# Patient Record
Sex: FEMALE | Race: WHITE | NOT HISPANIC OR LATINO | URBAN - METROPOLITAN AREA
[De-identification: names, ages, dates, MRNs, and addresses within clinical notes are randomized per-mention and may not be internally consistent; named-entity substitution may affect disease eponyms.]

---

## 2018-12-26 ENCOUNTER — IMPORTED ENCOUNTER (OUTPATIENT)
Dept: URBAN - METROPOLITAN AREA CLINIC 50 | Facility: CLINIC | Age: 83
End: 2018-12-26

## 2020-01-20 ENCOUNTER — IMPORTED ENCOUNTER (OUTPATIENT)
Dept: URBAN - METROPOLITAN AREA CLINIC 50 | Facility: CLINIC | Age: 85
End: 2020-01-20

## 2021-01-26 ENCOUNTER — IMPORTED ENCOUNTER (OUTPATIENT)
Dept: URBAN - METROPOLITAN AREA CLINIC 50 | Facility: CLINIC | Age: 86
End: 2021-01-26

## 2021-04-17 ASSESSMENT — VISUAL ACUITY
OS_BAT: 20/100
OD_BAT: 20/70
OD_BAT: 20/200
OS_CC: 20/40
OS_BAT: 20/50
OS_OTHER: 20/50.
OS_OTHER: 20/40. 20/60.
OS_OTHER: 20/100.
OS_CC: 20/30
OD_CC: 20/30
OD_OTHER: 20/70.
OD_CC: J1+
OD_CC: J1
OD_OTHER: 20/200.
OD_OTHER: 20/40. 20/70.
OS_CC: J1+
OD_BAT: 20/40
OS_CC: J1
OD_CC: 20/40
OD_CC: 20/30
OS_CC: 20/30
OS_BAT: 20/40

## 2021-04-17 ASSESSMENT — TONOMETRY
OS_IOP_MMHG: 17
OD_IOP_MMHG: 14
OS_IOP_MMHG: 14
OD_IOP_MMHG: 13
OD_IOP_MMHG: 15
OS_IOP_MMHG: 14

## 2021-08-11 ENCOUNTER — PROBLEM (OUTPATIENT)
Dept: URBAN - METROPOLITAN AREA CLINIC 49 | Facility: CLINIC | Age: 86
End: 2021-08-11

## 2021-08-11 DIAGNOSIS — S00.10XA: ICD-10-CM

## 2021-08-11 PROCEDURE — 92012 INTRM OPH EXAM EST PATIENT: CPT

## 2021-08-11 RX ORDER — PREDNISONE 20MG 20 MG/1
1 TABLET ORAL ONCE A DAY
Start: 2021-08-11 | End: 2021-08-16

## 2021-08-11 ASSESSMENT — TONOMETRY
OD_IOP_MMHG: 15
OS_IOP_MMHG: 14

## 2021-08-11 ASSESSMENT — VISUAL ACUITY
OS_CC: 20/40
OD_CC: 20/40

## 2021-08-11 NOTE — PATIENT DISCUSSION
discussed with patient that it is possibly from the hygienist.  recommend patient start prednisone 20 mg once a day for 5 days.
86

## 2022-01-25 ENCOUNTER — COMPREHENSIVE EXAM (OUTPATIENT)
Dept: URBAN - METROPOLITAN AREA CLINIC 49 | Facility: CLINIC | Age: 87
End: 2022-01-25

## 2022-01-25 DIAGNOSIS — H35.3121: ICD-10-CM

## 2022-01-25 DIAGNOSIS — Z01.01: ICD-10-CM

## 2022-01-25 DIAGNOSIS — H25.813: ICD-10-CM

## 2022-01-25 DIAGNOSIS — H52.4: ICD-10-CM

## 2022-01-25 DIAGNOSIS — S00.10XA: ICD-10-CM

## 2022-01-25 DIAGNOSIS — H43.23: ICD-10-CM

## 2022-01-25 PROCEDURE — 92014 COMPRE OPH EXAM EST PT 1/>: CPT

## 2022-01-25 PROCEDURE — 92134 CPTRZ OPH DX IMG PST SGM RTA: CPT

## 2022-01-25 PROCEDURE — 92015 DETERMINE REFRACTIVE STATE: CPT

## 2022-01-25 ASSESSMENT — VISUAL ACUITY
OD_CC: J1+
OU_CC: 20/30
OD_GLARE: 20/50
OS_CC: J1
OU_CC: J1+
OS_CC: 20/30-2
OD_CC: 20/30
OS_GLARE: 20/40

## 2022-01-25 ASSESSMENT — TONOMETRY
OD_IOP_MMHG: 16
OS_IOP_MMHG: 16

## 2022-01-25 NOTE — PATIENT DISCUSSION
discussed with patient that it is possibly from the hygienist.  recommend patient start prednisone 20 mg once a day for 5 days.

## 2022-05-06 ENCOUNTER — DIAGNOSTICS ONLY (OUTPATIENT)
Dept: URBAN - METROPOLITAN AREA CLINIC 52 | Facility: CLINIC | Age: 87
End: 2022-05-06

## 2022-05-06 DIAGNOSIS — H25.813: ICD-10-CM

## 2022-05-06 PROCEDURE — 92083 EXTENDED VISUAL FIELD XM: CPT

## 2022-05-10 ENCOUNTER — DIAGNOSTICS ONLY (OUTPATIENT)
Dept: URBAN - METROPOLITAN AREA CLINIC 48 | Facility: CLINIC | Age: 87
End: 2022-05-10

## 2022-05-10 DIAGNOSIS — H52.4: ICD-10-CM

## 2022-05-10 PROCEDURE — 92083 EXTENDED VISUAL FIELD XM: CPT

## 2022-05-10 NOTE — PATIENT DISCUSSION
Monocular Johnny completed due to requirement for state of [place]. DMV completed today.

## 2023-01-31 ENCOUNTER — COMPREHENSIVE EXAM (OUTPATIENT)
Dept: URBAN - METROPOLITAN AREA CLINIC 48 | Facility: LOCATION | Age: 88
End: 2023-01-31

## 2023-01-31 DIAGNOSIS — H43.23: ICD-10-CM

## 2023-01-31 DIAGNOSIS — H25.813: ICD-10-CM

## 2023-01-31 DIAGNOSIS — Z01.01: ICD-10-CM

## 2023-01-31 DIAGNOSIS — H52.4: ICD-10-CM

## 2023-01-31 DIAGNOSIS — H35.3121: ICD-10-CM

## 2023-01-31 PROCEDURE — 92015 DETERMINE REFRACTIVE STATE: CPT

## 2023-01-31 PROCEDURE — 92014 COMPRE OPH EXAM EST PT 1/>: CPT

## 2023-01-31 PROCEDURE — 92134 CPTRZ OPH DX IMG PST SGM RTA: CPT

## 2023-01-31 ASSESSMENT — TONOMETRY
OD_IOP_MMHG: 18
OS_IOP_MMHG: 19

## 2023-01-31 ASSESSMENT — VISUAL ACUITY
OD_GLARE: 20/70+2
OS_GLARE: 20/150
OD_CC: 20/40
OS_GLARE: 20/70
OU_CC: J1+@15
OD_GLARE: 20/40-2
OS_CC: 20/50-1

## 2023-01-31 ASSESSMENT — KERATOMETRY
OS_K2POWER_DIOPTERS: 47.25
OD_AXISANGLE_DEGREES: 70
OS_AXISANGLE2_DEGREES: 65
OS_AXISANGLE_DEGREES: 155
OD_K2POWER_DIOPTERS: 47.75
OD_AXISANGLE2_DEGREES: 160
OD_K1POWER_DIOPTERS: 46.25
OS_K1POWER_DIOPTERS: 46.00

## 2023-10-12 NOTE — PATIENT DISCUSSION
"""Informed patient that their cataract is visually significant and meets the criteria for cataract "" Crescentic Advancement Flap Text: The defect edges were debeveled with a #15 scalpel blade.  Given the location of the defect and the proximity to free margins a crescentic advancement flap was deemed most appropriate.  Using a sterile surgical marker, the appropriate advancement flap was drawn incorporating the defect and placing the expected incisions within the relaxed skin tension lines where possible.    The area thus outlined was incised deep to adipose tissue with a #15 scalpel blade.  The skin margins were undermined to an appropriate distance in all directions utilizing iris scissors.

## 2024-02-06 ENCOUNTER — COMPREHENSIVE EXAM (OUTPATIENT)
Dept: URBAN - METROPOLITAN AREA CLINIC 48 | Facility: LOCATION | Age: 89
End: 2024-02-06

## 2024-02-06 DIAGNOSIS — H52.4: ICD-10-CM

## 2024-02-06 DIAGNOSIS — H25.813: ICD-10-CM

## 2024-02-06 DIAGNOSIS — H35.3121: ICD-10-CM

## 2024-02-06 DIAGNOSIS — H43.23: ICD-10-CM

## 2024-02-06 DIAGNOSIS — Z01.01: ICD-10-CM

## 2024-02-06 PROCEDURE — 92015 DETERMINE REFRACTIVE STATE: CPT

## 2024-02-06 PROCEDURE — 92014 COMPRE OPH EXAM EST PT 1/>: CPT

## 2024-02-06 ASSESSMENT — VISUAL ACUITY
OS_CC: 20/50+2
OS_GLARE: 20/70-1
OD_GLARE: 20/50
OD_GLARE: 20/50+2
OS_GLARE: 20/60-1
OD_CC: 20/50+2
OU_SC: 20/50+2
OD_SC: 20/50+2
OU_CC: J1@14"
OS_SC: 20/200
OU_CC: 20/50+2

## 2024-02-06 ASSESSMENT — KERATOMETRY
OD_AXISANGLE_DEGREES: 45
OS_AXISANGLE2_DEGREES: 60
OD_K2POWER_DIOPTERS: 48.00
OS_K2POWER_DIOPTERS: 48.75
OS_AXISANGLE_DEGREES: 150
OD_AXISANGLE2_DEGREES: 135
OS_K1POWER_DIOPTERS: 46.75
OD_K1POWER_DIOPTERS: 46.50

## 2024-02-06 ASSESSMENT — TONOMETRY
OS_IOP_MMHG: 18
OD_IOP_MMHG: 20

## 2024-10-26 ENCOUNTER — APPOINTMENT (RX ONLY)
Dept: URBAN - METROPOLITAN AREA CLINIC 56 | Facility: CLINIC | Age: 89
Setting detail: DERMATOLOGY
End: 2024-10-26

## 2024-10-26 DIAGNOSIS — D69.2 OTHER NONTHROMBOCYTOPENIC PURPURA: ICD-10-CM

## 2024-10-26 DIAGNOSIS — D485 NEOPLASM OF UNCERTAIN BEHAVIOR OF SKIN: ICD-10-CM

## 2024-10-26 PROBLEM — D48.5 NEOPLASM OF UNCERTAIN BEHAVIOR OF SKIN: Status: ACTIVE | Noted: 2024-10-26

## 2024-10-26 PROCEDURE — ? BIOPSY BY PUNCH METHOD

## 2024-10-26 PROCEDURE — 11103 TANGNTL BX SKIN EA SEP/ADDL: CPT

## 2024-10-26 PROCEDURE — ? COUNSELING

## 2024-10-26 PROCEDURE — ? BIOPSY BY SHAVE METHOD

## 2024-10-26 PROCEDURE — 99202 OFFICE O/P NEW SF 15 MIN: CPT | Mod: 25

## 2024-10-26 PROCEDURE — 11104 PUNCH BX SKIN SINGLE LESION: CPT

## 2024-10-26 ASSESSMENT — LOCATION ZONE DERM: LOCATION ZONE: ARM

## 2024-10-26 ASSESSMENT — LOCATION DETAILED DESCRIPTION DERM: LOCATION DETAILED: LEFT PROXIMAL DORSAL FOREARM

## 2024-10-26 ASSESSMENT — LOCATION SIMPLE DESCRIPTION DERM: LOCATION SIMPLE: LEFT FOREARM

## 2024-10-26 NOTE — PROCEDURE: BIOPSY BY PUNCH METHOD
Detail Level: Detailed
Was A Bandage Applied: Yes
Punch Size In Mm: 5
Size Of Lesion In Cm (Optional): 0
Depth Of Punch Biopsy: dermis
Biopsy Type: H and E
Anesthesia Type: 1% lidocaine with epinephrine
Anesthesia Volume In Cc: 1
Hemostasis: None
Epidermal Sutures: 4-0 Monocryl
Wound Care: Petrolatum
Dressing: Band-Aid
Patient Will Remove Sutures At Home?: No
Lab: 6
Lab Facility: [id number]
Consent was obtained and risks were reviewed including but not limited to scarring, infection, bleeding, scabbing, incomplete removal, nerve damage and allergy to anesthesia.
Post-Care Instructions: I reviewed with the patient in detail post-care instructions. Patient is to keep the biopsy site dry overnight, and then apply Vaseline twice daily until healed.
Home Suture Removal Text: Patient was provided a home suture removal kit and will remove their sutures at home.  If they have any questions or difficulties they will call the office.
Notification Instructions: Patient will be notified of biopsy results. However, patient instructed to call the office if not contacted within 2 weeks.
Billing Type: Third-Party Bill
Information: Selecting Yes will display possible errors in your note based on the variables you have selected. This validation is only offered as a suggestion for you. PLEASE NOTE THAT THE VALIDATION TEXT WILL BE REMOVED WHEN YOU FINALIZE YOUR NOTE. IF YOU WANT TO FAX A PRELIMINARY NOTE YOU WILL NEED TO TOGGLE THIS TO 'NO' IF YOU DO NOT WANT IT IN YOUR FAXED NOTE.

## 2024-11-20 ENCOUNTER — APPOINTMENT (RX ONLY)
Dept: URBAN - METROPOLITAN AREA CLINIC 56 | Facility: CLINIC | Age: 89
Setting detail: DERMATOLOGY
End: 2024-11-20

## 2024-11-20 PROBLEM — D04.62 CARCINOMA IN SITU OF SKIN OF LEFT UPPER LIMB, INCLUDING SHOULDER: Status: ACTIVE | Noted: 2024-11-20

## 2024-11-20 PROCEDURE — ? CRYOSURGICAL DESTRUCTION

## 2024-11-20 PROCEDURE — 17261 DSTRJ MAL LES T/A/L .6-1.0CM: CPT

## 2025-03-20 ENCOUNTER — COMPREHENSIVE EXAM (OUTPATIENT)
Age: OVER 89
End: 2025-03-20

## 2025-03-20 DIAGNOSIS — H25.813: ICD-10-CM

## 2025-03-20 DIAGNOSIS — H35.3121: ICD-10-CM

## 2025-03-20 DIAGNOSIS — H52.4: ICD-10-CM

## 2025-03-20 DIAGNOSIS — H43.23: ICD-10-CM

## 2025-03-20 DIAGNOSIS — Z01.01: ICD-10-CM

## 2025-03-20 PROCEDURE — 92015 DETERMINE REFRACTIVE STATE: CPT

## 2025-03-20 PROCEDURE — 92014 COMPRE OPH EXAM EST PT 1/>: CPT

## 2025-03-26 ENCOUNTER — APPOINTMENT (OUTPATIENT)
Dept: URBAN - METROPOLITAN AREA CLINIC 56 | Facility: CLINIC | Age: OVER 89
Setting detail: DERMATOLOGY
End: 2025-03-26

## 2025-03-26 DIAGNOSIS — L72.8 OTHER FOLLICULAR CYSTS OF THE SKIN AND SUBCUTANEOUS TISSUE: ICD-10-CM

## 2025-03-26 DIAGNOSIS — L0390 CELLULITIS AND ABSCESS OF UNSPECIFIED SITES: ICD-10-CM

## 2025-03-26 DIAGNOSIS — L0391 CELLULITIS AND ABSCESS OF UNSPECIFIED SITES: ICD-10-CM

## 2025-03-26 PROBLEM — L03.114 CELLULITIS OF LEFT UPPER LIMB: Status: ACTIVE | Noted: 2025-03-26

## 2025-03-26 PROCEDURE — 99213 OFFICE O/P EST LOW 20 MIN: CPT | Mod: 25

## 2025-03-26 PROCEDURE — ? PRESCRIPTION MEDICATION MANAGEMENT

## 2025-03-26 PROCEDURE — 10060 I&D ABSCESS SIMPLE/SINGLE: CPT

## 2025-03-26 PROCEDURE — ? COUNSELING

## 2025-03-26 PROCEDURE — ? INCISION AND DRAINAGE

## 2025-03-26 PROCEDURE — ? PRESCRIPTION

## 2025-03-26 RX ORDER — DOXYCYCLINE HYCLATE 100 MG/1
CAPSULE, GELATIN COATED ORAL
Qty: 20 | Refills: 0 | Status: ERX | COMMUNITY
Start: 2025-03-26

## 2025-03-26 RX ADMIN — DOXYCYCLINE HYCLATE: 100 CAPSULE, GELATIN COATED ORAL at 00:00

## 2025-03-26 ASSESSMENT — LOCATION ZONE DERM
LOCATION ZONE: FINGER
LOCATION ZONE: ARM

## 2025-03-26 ASSESSMENT — LOCATION SIMPLE DESCRIPTION DERM
LOCATION SIMPLE: LEFT FOREARM
LOCATION SIMPLE: RIGHT INDEX FINGER

## 2025-03-26 ASSESSMENT — LOCATION DETAILED DESCRIPTION DERM
LOCATION DETAILED: RIGHT PROXIMAL DORSAL INDEX FINGER
LOCATION DETAILED: LEFT PROXIMAL DORSAL FOREARM

## 2025-03-26 NOTE — PROCEDURE: INCISION AND DRAINAGE
Detail Level: Detailed
Lesion Type: Cyst
Method: 15 blade
Curette: No
Size Of Lesion In Cm (Optional But May Be Required For Some Insurances): 0
Wound Care: Petrolatum
Dressing: dry sterile dressing
Epidermal Sutures: 4-0 Ethilon
Epidermal Closure: simple interrupted
Suture Text: The incision was partially closed with
Preparation Text: The area was prepped in the usual clean fashion.
Curette Text (Optional): After the contents were expressed a curette was used to partially remove the cyst wall.
Medical Necessity Clause: The procedure was medically necessary due to one or more of the following: infection, severe pain, erythema, and warmth. These symptoms are either too severe to respond to conservative measures or have failed conservative measures, and, therefore, procedural intervention is medically indicated
Consent was obtained and risks were reviewed including but not limited to delayed wound healing, infection, need for multiple I and D's, and pain.
Post-Care Instructions: I reviewed with the patient in detail post-care instructions. Patient should keep wound covered and call the office should any redness, pain, swelling or worsening occur.

## 2025-03-26 NOTE — PROCEDURE: PRESCRIPTION MEDICATION MANAGEMENT
Render In Strict Bullet Format?: No
Initiate Treatment: Doxycycline 100 mg bid x 10 days
Detail Level: Zone

## 2025-04-17 ENCOUNTER — APPOINTMENT (OUTPATIENT)
Dept: URBAN - METROPOLITAN AREA CLINIC 56 | Facility: CLINIC | Age: OVER 89
Setting detail: DERMATOLOGY
End: 2025-04-17

## 2025-04-17 DIAGNOSIS — L0391 CELLULITIS AND ABSCESS OF UNSPECIFIED SITES: ICD-10-CM | Status: IMPROVED

## 2025-04-17 DIAGNOSIS — D69.2 OTHER NONTHROMBOCYTOPENIC PURPURA: ICD-10-CM

## 2025-04-17 DIAGNOSIS — L0390 CELLULITIS AND ABSCESS OF UNSPECIFIED SITES: ICD-10-CM | Status: IMPROVED

## 2025-04-17 PROBLEM — L03.114 CELLULITIS OF LEFT UPPER LIMB: Status: ACTIVE | Noted: 2025-04-17

## 2025-04-17 PROCEDURE — ? COUNSELING

## 2025-04-17 PROCEDURE — ? PRESCRIPTION MEDICATION MANAGEMENT

## 2025-04-17 PROCEDURE — 99213 OFFICE O/P EST LOW 20 MIN: CPT

## 2025-04-17 ASSESSMENT — LOCATION ZONE DERM
LOCATION ZONE: ARM
LOCATION ZONE: LEG

## 2025-04-17 ASSESSMENT — LOCATION DETAILED DESCRIPTION DERM
LOCATION DETAILED: LEFT PROXIMAL DORSAL FOREARM
LOCATION DETAILED: LEFT DISTAL PRETIBIAL REGION

## 2025-04-17 ASSESSMENT — LOCATION SIMPLE DESCRIPTION DERM
LOCATION SIMPLE: LEFT FOREARM
LOCATION SIMPLE: LEFT PRETIBIAL REGION